# Patient Record
Sex: FEMALE | Race: BLACK OR AFRICAN AMERICAN
[De-identification: names, ages, dates, MRNs, and addresses within clinical notes are randomized per-mention and may not be internally consistent; named-entity substitution may affect disease eponyms.]

---

## 2019-10-10 ENCOUNTER — HOSPITAL ENCOUNTER (EMERGENCY)
Dept: HOSPITAL 53 - M ED | Age: 24
Discharge: HOME | End: 2019-10-10
Payer: COMMERCIAL

## 2019-10-10 VITALS — HEIGHT: 64 IN | WEIGHT: 147.49 LBS | BODY MASS INDEX: 25.18 KG/M2

## 2019-10-10 VITALS — DIASTOLIC BLOOD PRESSURE: 58 MMHG | SYSTOLIC BLOOD PRESSURE: 110 MMHG

## 2019-10-10 DIAGNOSIS — N83.201: Primary | ICD-10-CM

## 2019-10-10 DIAGNOSIS — N83.202: ICD-10-CM

## 2019-10-10 LAB
ALBUMIN SERPL BCG-MCNC: 3.7 GM/DL (ref 3.2–5.2)
ALT SERPL W P-5'-P-CCNC: 17 U/L (ref 12–78)
BASOPHILS # BLD AUTO: 0 10^3/UL (ref 0–0.2)
BASOPHILS NFR BLD AUTO: 0.5 % (ref 0–1)
BILIRUB CONJ SERPL-MCNC: 0.1 MG/DL (ref 0–0.2)
BILIRUB SERPL-MCNC: 0.3 MG/DL (ref 0.2–1)
EOSINOPHIL # BLD AUTO: 0.1 10^3/UL (ref 0–0.5)
EOSINOPHIL NFR BLD AUTO: 1 % (ref 0–3)
HCT VFR BLD AUTO: 38.8 % (ref 36–47)
HGB BLD-MCNC: 12.9 G/DL (ref 12–15.5)
LIPASE SERPL-CCNC: 73 U/L (ref 73–393)
LYMPHOCYTES # BLD AUTO: 1.9 10^3/UL (ref 1.5–5)
LYMPHOCYTES NFR BLD AUTO: 24.5 % (ref 24–44)
MCH RBC QN AUTO: 31.4 PG (ref 27–33)
MCHC RBC AUTO-ENTMCNC: 33.2 G/DL (ref 32–36.5)
MCV RBC AUTO: 94.4 FL (ref 80–96)
MONOCYTES # BLD AUTO: 0.4 10^3/UL (ref 0–0.8)
MONOCYTES NFR BLD AUTO: 5.4 % (ref 0–5)
NEUTROPHILS # BLD AUTO: 5.4 10^3/UL (ref 1.5–8.5)
NEUTROPHILS NFR BLD AUTO: 68.3 % (ref 36–66)
PLATELET # BLD AUTO: 209 10^3/UL (ref 150–450)
PROT SERPL-MCNC: 7.2 GM/DL (ref 6.4–8.2)
RBC # BLD AUTO: 4.11 10^6/UL (ref 4–5.4)
WBC # BLD AUTO: 7.8 10^3/UL (ref 4–10)

## 2019-10-10 PROCEDURE — 80076 HEPATIC FUNCTION PANEL: CPT

## 2019-10-10 PROCEDURE — 99284 EMERGENCY DEPT VISIT MOD MDM: CPT

## 2019-10-10 PROCEDURE — 76856 US EXAM PELVIC COMPLETE: CPT

## 2019-10-10 PROCEDURE — 83690 ASSAY OF LIPASE: CPT

## 2019-10-10 PROCEDURE — 84702 CHORIONIC GONADOTROPIN TEST: CPT

## 2019-10-10 PROCEDURE — 81001 URINALYSIS AUTO W/SCOPE: CPT

## 2019-10-10 PROCEDURE — 76830 TRANSVAGINAL US NON-OB: CPT

## 2019-10-10 PROCEDURE — 93976 VASCULAR STUDY: CPT

## 2019-10-10 PROCEDURE — 96374 THER/PROPH/DIAG INJ IV PUSH: CPT

## 2019-10-10 PROCEDURE — 85025 COMPLETE CBC W/AUTO DIFF WBC: CPT

## 2019-10-10 PROCEDURE — 80047 BASIC METABLC PNL IONIZED CA: CPT

## 2019-10-10 PROCEDURE — 96375 TX/PRO/DX INJ NEW DRUG ADDON: CPT

## 2019-10-10 PROCEDURE — 83605 ASSAY OF LACTIC ACID: CPT

## 2019-10-10 PROCEDURE — 74177 CT ABD & PELVIS W/CONTRAST: CPT

## 2019-10-10 NOTE — REP
PELVIS ULTRASOUND:

 

Real-time sonographic evaluation of the pelvis is performed utilizing

transabdominal and endovaginal technique.  The bladder measures 5.0 x 3.3 x 7.7

cm.  The uterus measures 7.2 x 5.5 x 7.7 cm.  The endometrial thickness is 11 mm.

The right ovary measures 3.1 x 2.0 x 2.9 cm and left ovary 3.0 x 2.0 x 2.2 cm.

There is no ovarian torsion, blood flow is seen in each ovary with duplex Doppler

evaluation.  There is bilateral free fluid.  There is a paraovarian cyst in the

right adnexa separate from the right ovary 2.0 x 1.5 x 2.3 cm.  There is a

paraovarian cystic structure in the left adnexa as well 1.9 x 1.1 x 1.5 cm.

 

IMPRESSION:

 

Small paraovarian cyst is seen bilaterally.  Moderate free fluid.  No torsion.

 

 

Electronically Signed by

Justino Vicente MD 10/15/2019 09:03 A

## 2019-10-10 NOTE — REP
CT ABDOMEN AND PELVIS WITH IV CONTRAST:

 

TECHNIQUE:  Axial contrast enhanced images from the lung bases to the pubic

symphysis using 100 mL Isovue 370 intravenous contrast material with multiplanar

reformations.

 

Visualized lung bases demonstrate no infiltrate.  The liver, gallbladder, spleen,

adrenals, pancreas and kidneys are unremarkable.  There is no hydronephrosis

bilaterally.  There is no abdominal aortic aneurysm.  There is no adenopathy or

free air.  No bowel thickening is seen.  There is no evidence of appendicitis.

There is mild diastasis of the rectus muscles.  There may be a cyst of the right

ovary approximately 2.8 cm in maximum diameter.  There is mild free fluid in the

pelvis.  The urinary bladder is not well distended and not well evaluated.

 

IMPRESSION:

 

Appendix is normal.  There is no appendicitis.  There may be a cyst of the right

ovary 2.8 cm in maximum diameter.  There is mild free fluid in the pelvis.  No

other acute finding.

 

 

Electronically Signed by

Justino Vicente MD 10/15/2019 08:59 A

## 2021-07-16 ENCOUNTER — HOSPITAL ENCOUNTER (OUTPATIENT)
Dept: HOSPITAL 53 - M WHC | Age: 26
End: 2021-07-16
Payer: COMMERCIAL

## 2021-07-16 DIAGNOSIS — Z12.31: Primary | ICD-10-CM

## 2021-07-16 NOTE — REP
INDICATION:

[facility] MAMMO/L BRST LUMP/NIPPLE DISCHARGE/FAM HX.



COMPARISON:

None



TECHNIQUE:

Diagnostic digital bilateral mammography was obtained in the CC and MLO projections

using both 2D and 3D modalities.  Diagnostic magnified spot compression views of the

left breast were obtained upper-outer quadrant over the region of a clinically

palpable mass.  In addition, diagnostic ultrasonography was obtained at the 3 o'clock

position over the palpable area.  Shear wave elastography was also obtained.  The area

of interest was indicated by the technologist placing triangular-shaped markers on the

skin.



FINDINGS:

The breasts are symmetric in size and shape.



Markedly dense heterogenous somewhat nodular fibroglandular elements are seen

bilaterally to such is significant degree that the sensitivity of the mammogram in

detecting cancer is significantly decreased.



In the upper outer quadrant of the left breast there is a smoothly marginated reniform

shaped nodular density which on the CC view has a notched lucent center.  Diagnostic

digital magnified spot compression views of this region confirms its presence and

having 100% distinct margins.



Diagnostic ultrasonography of the clinical region of interest shows a smoothly

marginated solid nodule which is reniform in shape and has a vascular fatty petra

measuring approximately 0.7 x 0.4 by 0.7 cm.  Shear wave elastography was performed on

this and shows very low kPa values.



No other suspicious areas are seen in either breast.





The Volpara volumetric breast density pattern is D



IMPRESSION:

BIRADS/ACR category 2 mammogram and benign left breast ultrasound showing an

intramammary lymph node.  There is no mammographic evidence or ultrasonographic

evidence of malignant alteration of either breast.  Since only 1 of the suspected 2

palpable areas shows a benign nodule without identification of a 2nd nodule

mammographically or ultrasonographically surgical consultation may be necessary.  In

addition, due to the patient's high Tyrer Cuzick score and significant family history

of breast cancer along with the patient's breast density score of D, bilateral breast

MRI is recommended at this time.



This patient's Tyrer-Cuzick lifetime breast cancer risk assessment score is 26.5%.



This mammogram was interpreted with the aid of an FDA-approved computer-aided

detection system.



The patient states she had a clinical breast exam in April 2021.



The patient letter being requested is M2.



RECOMMENDATION:

Bilateral breast MRI is recommended





<Electronically signed by Moe Guzmán > 07/16/21 [id number]